# Patient Record
Sex: FEMALE | Race: WHITE | NOT HISPANIC OR LATINO | Employment: FULL TIME | URBAN - METROPOLITAN AREA
[De-identification: names, ages, dates, MRNs, and addresses within clinical notes are randomized per-mention and may not be internally consistent; named-entity substitution may affect disease eponyms.]

---

## 2024-04-08 ENCOUNTER — LAB REQUISITION (OUTPATIENT)
Dept: LAB | Facility: CLINIC | Age: 29
End: 2024-04-08
Payer: COMMERCIAL

## 2024-04-08 DIAGNOSIS — Z01.419 ENCOUNTER FOR GYNECOLOGICAL EXAMINATION (GENERAL) (ROUTINE) WITHOUT ABNORMAL FINDINGS: ICD-10-CM

## 2024-04-08 PROCEDURE — G0123 SCREEN CERV/VAG THIN LAYER: HCPCS | Mod: ORL | Performed by: PHYSICIAN ASSISTANT

## 2024-04-10 LAB
BKR LAB AP GYN ADEQUACY: NORMAL
BKR LAB AP GYN INTERPRETATION: NORMAL
BKR LAB AP HPV REFLEX: NORMAL
BKR LAB AP LMP: NORMAL
BKR LAB AP PREVIOUS ABNL DX: NORMAL
BKR LAB AP PREVIOUS ABNORMAL: NORMAL
PATH REPORT.COMMENTS IMP SPEC: NORMAL
PATH REPORT.COMMENTS IMP SPEC: NORMAL
PATH REPORT.RELEVANT HX SPEC: NORMAL

## 2024-07-18 ENCOUNTER — LAB REQUISITION (OUTPATIENT)
Dept: LAB | Facility: CLINIC | Age: 29
End: 2024-07-18
Payer: COMMERCIAL

## 2024-07-18 DIAGNOSIS — Z34.90 ENCOUNTER FOR SUPERVISION OF NORMAL PREGNANCY, UNSPECIFIED, UNSPECIFIED TRIMESTER: ICD-10-CM

## 2024-07-18 LAB
BASOPHILS # BLD AUTO: 0 10E3/UL (ref 0–0.2)
BASOPHILS NFR BLD AUTO: 0 %
EOSINOPHIL # BLD AUTO: 0.2 10E3/UL (ref 0–0.7)
EOSINOPHIL NFR BLD AUTO: 3 %
ERYTHROCYTE [DISTWIDTH] IN BLOOD BY AUTOMATED COUNT: 12.8 % (ref 10–15)
HBA1C MFR BLD: 5.2 %
HCT VFR BLD AUTO: 40.5 % (ref 35–47)
HGB BLD-MCNC: 14 G/DL (ref 11.7–15.7)
IMM GRANULOCYTES # BLD: 0 10E3/UL
IMM GRANULOCYTES NFR BLD: 0 %
LYMPHOCYTES # BLD AUTO: 2.5 10E3/UL (ref 0.8–5.3)
LYMPHOCYTES NFR BLD AUTO: 28 %
MCH RBC QN AUTO: 30.2 PG (ref 26.5–33)
MCHC RBC AUTO-ENTMCNC: 34.6 G/DL (ref 31.5–36.5)
MCV RBC AUTO: 87 FL (ref 78–100)
MONOCYTES # BLD AUTO: 0.6 10E3/UL (ref 0–1.3)
MONOCYTES NFR BLD AUTO: 6 %
NEUTROPHILS # BLD AUTO: 5.6 10E3/UL (ref 1.6–8.3)
NEUTROPHILS NFR BLD AUTO: 63 %
NRBC # BLD AUTO: 0 10E3/UL
NRBC BLD AUTO-RTO: 0 /100
PLATELET # BLD AUTO: 298 10E3/UL (ref 150–450)
RBC # BLD AUTO: 4.64 10E6/UL (ref 3.8–5.2)
WBC # BLD AUTO: 9 10E3/UL (ref 4–11)

## 2024-07-18 PROCEDURE — 87086 URINE CULTURE/COLONY COUNT: CPT | Mod: ORL | Performed by: OBSTETRICS & GYNECOLOGY

## 2024-07-18 PROCEDURE — 86803 HEPATITIS C AB TEST: CPT | Mod: ORL | Performed by: OBSTETRICS & GYNECOLOGY

## 2024-07-18 PROCEDURE — 83036 HEMOGLOBIN GLYCOSYLATED A1C: CPT | Mod: ORL | Performed by: OBSTETRICS & GYNECOLOGY

## 2024-07-18 PROCEDURE — 80081 OBSTETRIC PANEL INC HIV TSTG: CPT | Mod: ORL | Performed by: OBSTETRICS & GYNECOLOGY

## 2024-07-19 LAB
ABO/RH(D): NORMAL
ANTIBODY SCREEN: NEGATIVE
HBV SURFACE AG SERPL QL IA: NONREACTIVE
HCV AB SERPL QL IA: NONREACTIVE
HIV 1+2 AB+HIV1 P24 AG SERPL QL IA: NONREACTIVE
RPR SER QL: NONREACTIVE
RUBV IGG SERPL QL IA: 1.42 INDEX
RUBV IGG SERPL QL IA: POSITIVE
SPECIMEN EXPIRATION DATE: NORMAL

## 2024-07-20 LAB — BACTERIA UR CULT: NO GROWTH

## 2024-07-27 ENCOUNTER — TRANSFERRED RECORDS (OUTPATIENT)
Dept: HEALTH INFORMATION MANAGEMENT | Facility: CLINIC | Age: 29
End: 2024-07-27

## 2024-08-22 ENCOUNTER — LAB REQUISITION (OUTPATIENT)
Dept: LAB | Facility: CLINIC | Age: 29
End: 2024-08-22
Payer: COMMERCIAL

## 2024-08-22 ENCOUNTER — TRANSFERRED RECORDS (OUTPATIENT)
Dept: HEALTH INFORMATION MANAGEMENT | Facility: CLINIC | Age: 29
End: 2024-08-22

## 2024-08-22 DIAGNOSIS — Z36.89 ENCOUNTER FOR OTHER SPECIFIED ANTENATAL SCREENING: ICD-10-CM

## 2024-08-22 PROCEDURE — 82105 ALPHA-FETOPROTEIN SERUM: CPT | Mod: ORL | Performed by: OBSTETRICS & GYNECOLOGY

## 2024-08-23 LAB
# FETUSES US: NORMAL
AFP MOM SERPL: 1.06
AFP SERPL-MCNC: 39 NG/ML
AGE - REPORTED: 28 YR
CURRENT SMOKER: NO
FAMILY MEMBER DISEASES HX: NO
GA METHOD: NORMAL
GA: NORMAL WK
IDDM PATIENT QL: NO
INTEGRATED SCN PATIENT-IMP: NORMAL
SPECIMEN DRAWN SERPL: NORMAL

## 2024-09-17 ENCOUNTER — MEDICAL CORRESPONDENCE (OUTPATIENT)
Dept: HEALTH INFORMATION MANAGEMENT | Facility: CLINIC | Age: 29
End: 2024-09-17
Payer: COMMERCIAL

## 2024-09-18 ENCOUNTER — TRANSCRIBE ORDERS (OUTPATIENT)
Dept: MATERNAL FETAL MEDICINE | Facility: CLINIC | Age: 29
End: 2024-09-18
Payer: COMMERCIAL

## 2024-09-18 DIAGNOSIS — O26.90 PREGNANCY RELATED CONDITION, ANTEPARTUM: Primary | ICD-10-CM

## 2024-09-19 ENCOUNTER — PRE VISIT (OUTPATIENT)
Dept: MATERNAL FETAL MEDICINE | Facility: CLINIC | Age: 29
End: 2024-09-19
Payer: COMMERCIAL

## 2024-09-23 ENCOUNTER — OFFICE VISIT (OUTPATIENT)
Dept: MATERNAL FETAL MEDICINE | Facility: CLINIC | Age: 29
End: 2024-09-23
Attending: OBSTETRICS & GYNECOLOGY
Payer: COMMERCIAL

## 2024-09-23 ENCOUNTER — HOSPITAL ENCOUNTER (OUTPATIENT)
Dept: ULTRASOUND IMAGING | Facility: CLINIC | Age: 29
Discharge: HOME | End: 2024-09-23
Attending: OBSTETRICS & GYNECOLOGY
Payer: COMMERCIAL

## 2024-09-23 DIAGNOSIS — Q33.0 CONGENITAL PULMONARY AIRWAY MALFORMATION (CPAM): Primary | ICD-10-CM

## 2024-09-23 DIAGNOSIS — O26.90 PREGNANCY RELATED CONDITION, ANTEPARTUM: ICD-10-CM

## 2024-09-23 PROCEDURE — 76811 OB US DETAILED SNGL FETUS: CPT

## 2024-09-23 PROCEDURE — 76811 OB US DETAILED SNGL FETUS: CPT | Mod: 26 | Performed by: OBSTETRICS & GYNECOLOGY

## 2024-09-23 PROCEDURE — 99203 OFFICE O/P NEW LOW 30 MIN: CPT | Mod: 25 | Performed by: OBSTETRICS & GYNECOLOGY

## 2024-09-23 NOTE — PROGRESS NOTES
Please see full imaging report from ViewPoint program under imaging tab.    Thank-you for referring your patient for ultrasound assessment for a fetal CPAM, which is confirmed today in the right side of the fetal chest.    I discussed the findings on today's ultrasound with the patient and her partner. I reviewed the limitations of ultrasound both in detecting aneuploidy and structural abnormalities.  Ultrasound, when views completed, can routinely detect 80-90% of structural abnormalities. She had low risk cell free fetal DNA for genetic screening this pregnancy.     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 We reviewed that CPAMs are the most common lung lesion noted in fetuses. The lesion is abnormally formed lung tissue- abnormal solid or cystic pulmonary tissue with proliferation of bronchial structures at the expense of alveloar development. We discussed that CPAMs have a variable appearance from solid-appearing (microcystic) to complex cystic masses comprised of 1 or more cysts >=5mm (macrocystic).      We discussed that isolated CPAMs are not associated with an increased risk of chromosomal abnormalities, and it is reassuring that she had a low risk NIPT screen.      We reviewed the prognosis for this condition depends on the size of the lesion, the degree of pulmonary hypoplasia, existence of other anomalies and the development of hydrops, but that prognosis is typically excellent. Typically the CPAM grows in size until about 28 weeks and then stabilize or even regresses in size. Occasionally, it may no longer be visible on ultrasound prior to delivery. There is however a risk for fetal hydrops with this condition and serially ultrasounds are recommended. The risk for hydrops increases with rapid growth of the CPAM, mediastinal shift and large dominant cysts  within the mass. We use the CVR (CPAM volume ratio) to determine the risk for fetal hydrops. The CVR today is 0.58, which is reassuring. CVR of <1.6 is associated with a low risk for development of fetal hydrops.    If the lesion regressed and were small, delivery at [facility] is possible, but if the lesion were still substantial in size, or earlier delivery was warranted due to development of hydrops, then delivery would be coordinated at [facility].  They are aware of this possibility.     At this point we will plan to see the patient every other week for surveillance for development of  hydrops and assessment of the CVR. If the CVR is increasing, we can increase surveillance to weekly. Fetal echocardiogram will be scheduled. Follow-up assessment of fetal growth is planned in 4 weeks. We will also coordinate Pediatric Surgery and NICU consultations in the 3rd trimester with NICU as needed depending on findings - this will be done through our Fetal Diagnosis and Treatment Center team at [facility].     Return to primary provider for continued prenatal care until it is determined if transfer of care for alternate delivery site will be required.    If you have questions regarding today's evaluation or if we can be of further service, please contact the Maternal-Fetal Medicine Center.    **Fetal anomalies may be present but not detected**     I spent a total of 40 minutes on the date of this encounter including preparing to see the patient (reviewing medical records/tests), counseling and discussing the plan of care, documenting the visit in the electronic medical record, and communicating with other health care professionals and/or care coordination.    Remberto Jones MD  Maternal Fetal Medicine

## 2024-09-23 NOTE — NURSING NOTE
Patient reports positive fetal movement, no pain, no contractions, leaking of fluid, or bleeding.   SBAR given to [facility], see their note in Epic.

## 2024-10-07 ENCOUNTER — OFFICE VISIT (OUTPATIENT)
Dept: MATERNAL FETAL MEDICINE | Facility: CLINIC | Age: 29
End: 2024-10-07
Attending: OBSTETRICS & GYNECOLOGY
Payer: COMMERCIAL

## 2024-10-07 ENCOUNTER — HOSPITAL ENCOUNTER (OUTPATIENT)
Dept: ULTRASOUND IMAGING | Facility: CLINIC | Age: 29
Discharge: HOME | End: 2024-10-07
Attending: OBSTETRICS & GYNECOLOGY
Payer: COMMERCIAL

## 2024-10-07 DIAGNOSIS — Q33.0 CONGENITAL PULMONARY AIRWAY MALFORMATION (CPAM): ICD-10-CM

## 2024-10-07 DIAGNOSIS — Q33.0 CONGENITAL PULMONARY AIRWAY MALFORMATION (CPAM): Primary | ICD-10-CM

## 2024-10-07 PROCEDURE — 76816 OB US FOLLOW-UP PER FETUS: CPT

## 2024-10-07 PROCEDURE — 76816 OB US FOLLOW-UP PER FETUS: CPT | Mod: 26 | Performed by: STUDENT IN AN ORGANIZED HEALTH CARE EDUCATION/TRAINING PROGRAM

## 2024-10-07 NOTE — NURSING NOTE
Patient presents to [facility] for RL2 at 23w0d due to CPAM check. Positive fetal movement. Denies LOF, vaginal bleeding or cramping/contractions. SBAR given to [facility] MD, see their note in Epic.

## 2024-10-08 NOTE — PROGRESS NOTES
The patient was seen for an ultrasound in the M[facility]Fetal Medicine Center clinic today.  For a detailed report of the ultrasound examination, please see the ultrasound report which can be found under the imaging tab.    If you have questions regarding today's evaluation or if we can be of further service, please contact the Maternal-Fetal Medicine Center.    Conor Howe M.D.  Maternal Fetal-Medicine Specialist

## 2024-10-18 ENCOUNTER — OFFICE VISIT (OUTPATIENT)
Dept: MATERNAL FETAL MEDICINE | Facility: CLINIC | Age: 29
End: 2024-10-18
Attending: OBSTETRICS & GYNECOLOGY
Payer: COMMERCIAL

## 2024-10-18 ENCOUNTER — OFFICE VISIT (OUTPATIENT)
Dept: PEDIATRIC CARDIOLOGY | Facility: CLINIC | Age: 29
End: 2024-10-18
Payer: COMMERCIAL

## 2024-10-18 ENCOUNTER — HOSPITAL ENCOUNTER (OUTPATIENT)
Dept: ULTRASOUND IMAGING | Facility: CLINIC | Age: 29
Discharge: HOME | End: 2024-10-18
Attending: OBSTETRICS & GYNECOLOGY
Payer: COMMERCIAL

## 2024-10-18 ENCOUNTER — HOSPITAL ENCOUNTER (OUTPATIENT)
Dept: CARDIOLOGY | Facility: CLINIC | Age: 29
Discharge: HOME | End: 2024-10-18
Attending: OBSTETRICS & GYNECOLOGY | Admitting: OBSTETRICS & GYNECOLOGY
Payer: COMMERCIAL

## 2024-10-18 DIAGNOSIS — Q33.0 CONGENITAL PULMONARY AIRWAY MALFORMATION (CPAM): ICD-10-CM

## 2024-10-18 DIAGNOSIS — O35.BXX0 ANOMALY OF HEART OF FETUS AFFECTING PREGNANCY, ANTEPARTUM, SINGLE OR UNSPECIFIED FETUS: Primary | ICD-10-CM

## 2024-10-18 DIAGNOSIS — O35.9XX0 FETAL ABNORMALITY AFFECTING MANAGEMENT OF MOTHER, SINGLE OR UNSPECIFIED FETUS: Primary | ICD-10-CM

## 2024-10-18 PROCEDURE — 93325 DOPPLER ECHO COLOR FLOW MAPG: CPT

## 2024-10-18 PROCEDURE — 99202 OFFICE O/P NEW SF 15 MIN: CPT | Mod: 25 | Performed by: PEDIATRICS

## 2024-10-18 PROCEDURE — 76827 ECHO EXAM OF FETAL HEART: CPT | Mod: 26 | Performed by: PEDIATRICS

## 2024-10-18 PROCEDURE — 76825 ECHO EXAM OF FETAL HEART: CPT | Mod: 26 | Performed by: PEDIATRICS

## 2024-10-18 PROCEDURE — 76816 OB US FOLLOW-UP PER FETUS: CPT | Mod: 26 | Performed by: OBSTETRICS & GYNECOLOGY

## 2024-10-18 PROCEDURE — 76816 OB US FOLLOW-UP PER FETUS: CPT

## 2024-10-18 PROCEDURE — 93325 DOPPLER ECHO COLOR FLOW MAPG: CPT | Mod: 26 | Performed by: PEDIATRICS

## 2024-10-18 NOTE — NURSING NOTE
Patient presents to [facility] for RL2 at 24w4d due to growth, CPAM. Denies LOF, vaginal bleeding, cramping/contractions. SBAR given to [facility], see their note in Epic.

## 2024-10-18 NOTE — LETTER
10/18/2024      RE: Giuseppe Morin  [address]  [place] [address]     Dear Colleague,    Thank you for the opportunity to participate in the care of your patient, Giuseppe Morin, at the [facility] at [facility]. Please see a copy of my visit note below.    [facility]   Heart Center Fetal Consult Note    Patient:  Giuseppe Morin MRN:  [id number]   YOB: 1995 Age:  29 year old   Date of Visit:  10/18/2024 PCP:  Sol Vogt MD     Dear Doctor:    I had the pleasure of seeing Giuseppe Morin at the [facility] on 10/18/2024 in fetal cardiology consultation for fetal echocardiogram results. She presented today accompanied by her partner. As you know, she is a 29 year old  at 24w4d who presented for fetal echocardiogram today because of a finding of CPAM on screening ultrasound.     I performed and interpreted the fetal echocardiogram today, which demonstrated Normal fetal cardiac anatomy. Normal fetal intracardiac connections. Normal right and left ventricular size and function. Fetal heart rate is regular at 165 bpm. No hydrops.     I reviewed today's findings with Giuseppe Morin and her partner. She is aware that the study was within normal limits with no major cardiac abnormalities. She is aware of the general limitations of fetal echocardiography. No additional fetal echocardiograms are recommended. No  cardiac follow-up is required.     Thank you for allowing me to participate in Giuseppe's care. Please do not hesitate to contact me with questions or concerns.    This visit was separate from the performance and interpretation of the ultrasound. The majority of the time (>50%) was spent in counseling and coordination of care. I spent approximately 20 minutes in face-to-face time reviewing the above considerations.    Osiel Cr M.D.  Pediatric  Cardiology  [facility]  [address] 4th floor, [place] [facility] [address]  Phone [phone]  Fax [phone]      Please do not hesitate to contact me if you have any questions/concerns.     Sincerely,       Elliot Cr MD

## 2024-10-18 NOTE — PROGRESS NOTES
[facility]   Heart Center Fetal Consult Note    Patient:  Giuseppe Morin MRN:  [id number]   YOB: 1995 Age:  29 year old   Date of Visit:  10/18/2024 PCP:  Sol Vogt MD     Dear Doctor:    I had the pleasure of seeing Giuseppe Morin at the [facility] on 10/18/2024 in fetal cardiology consultation for fetal echocardiogram results. She presented today accompanied by her partner. As you know, she is a 29 year old  at 24w4d who presented for fetal echocardiogram today because of a finding of CPAM on screening ultrasound.     I performed and interpreted the fetal echocardiogram today, which demonstrated Normal fetal cardiac anatomy. Normal fetal intracardiac connections. Normal right and left ventricular size and function. Fetal heart rate is regular at 165 bpm. No hydrops.     I reviewed today's findings with Giuseppe Morin and her partner. She is aware that the study was within normal limits with no major cardiac abnormalities. She is aware of the general limitations of fetal echocardiography. No additional fetal echocardiograms are recommended. No  cardiac follow-up is required.     Thank you for allowing me to participate in Giuseppe's care. Please do not hesitate to contact me with questions or concerns.    This visit was separate from the performance and interpretation of the ultrasound. The majority of the time (>50%) was spent in counseling and coordination of care. I spent approximately 20 minutes in face-to-face time reviewing the above considerations.    Osiel Cr M.D.  Pediatric Cardiology  [address] [facility], [place] [address]  Phone [phone]  Fax [phone]

## 2024-10-18 NOTE — PROGRESS NOTES
Please see full imaging report from ViewPoint program under imaging tab.    Thank-you for referring your patient for ultrasound assessment. I discussed the findings on today's ultrasound with the patient and her partner.     She had a normal fetal echo earlier today. There is no current evidence that the CPAM is increasing in size.     Follow-up is scheduled in 2 weeks to reassess CPAM/CVR and we will re-evaluate growth in four weeks.     As the pregnancy progresses we will arrange consultations with pediatrics/NICU and pediatric surgery. We have not yet determined whether she can deliver with her primary OB team at [facility] or will need to transfer to our team for delivery at [facility].     Return to primary provider for continued prenatal care.    If you have questions regarding today's evaluation or if we can be of further service, please contact the Maternal-Fetal Medicine Center.    **Fetal anomalies may be present but not detected**     Remberto Jones MD  Maternal Fetal Medicine

## 2024-10-24 ENCOUNTER — TRANSFERRED RECORDS (OUTPATIENT)
Dept: HEALTH INFORMATION MANAGEMENT | Facility: CLINIC | Age: 29
End: 2024-10-24
Payer: COMMERCIAL

## 2024-11-07 ENCOUNTER — OFFICE VISIT (OUTPATIENT)
Dept: MATERNAL FETAL MEDICINE | Facility: CLINIC | Age: 29
End: 2024-11-07
Attending: OBSTETRICS & GYNECOLOGY
Payer: COMMERCIAL

## 2024-11-07 ENCOUNTER — HOSPITAL ENCOUNTER (OUTPATIENT)
Dept: ULTRASOUND IMAGING | Facility: CLINIC | Age: 29
Discharge: HOME | End: 2024-11-07
Attending: OBSTETRICS & GYNECOLOGY
Payer: COMMERCIAL

## 2024-11-07 DIAGNOSIS — Q33.0 CONGENITAL PULMONARY AIRWAY MALFORMATION (CPAM): ICD-10-CM

## 2024-11-07 DIAGNOSIS — Q33.2 INTRALOBAR BRONCHOPULMONARY SEQUESTRATION: ICD-10-CM

## 2024-11-07 DIAGNOSIS — Q33.0 CONGENITAL PULMONARY AIRWAY MALFORMATION (CPAM): Primary | ICD-10-CM

## 2024-11-07 DIAGNOSIS — O35.9XX0 FETAL ABNORMALITY AFFECTING MANAGEMENT OF MOTHER, SINGLE OR UNSPECIFIED FETUS: Primary | ICD-10-CM

## 2024-11-07 PROCEDURE — 76816 OB US FOLLOW-UP PER FETUS: CPT | Mod: 26 | Performed by: OBSTETRICS & GYNECOLOGY

## 2024-11-07 PROCEDURE — 76816 OB US FOLLOW-UP PER FETUS: CPT

## 2024-11-07 PROCEDURE — 99213 OFFICE O/P EST LOW 20 MIN: CPT | Mod: 25 | Performed by: OBSTETRICS & GYNECOLOGY

## 2024-11-07 NOTE — NURSING NOTE
Patient presents to [facility] for RL2 at 27w3d due to fetal CPAM. Positive fetal movement. Denies LOF, vaginal bleeding or cramping/contractions. SBAR given to [facility] MD, see their note in Epic.

## 2024-11-07 NOTE — PROGRESS NOTES
The patient was seen for an ultrasound in the Maternal-Fetal Medicine Center at the [facility] today.  For a detailed report of the ultrasound examination, please see the ultrasound report which can be found under the imaging tab.    If you have questions regarding today's evaluation or if we can be of further service, please contact the Maternal-Fetal Medicine Center.    Tsering Arzola MD  , OB/GYN  Maternal-Fetal Medicine  [phone] (Pager)

## 2024-11-14 ENCOUNTER — LAB REQUISITION (OUTPATIENT)
Dept: LAB | Facility: CLINIC | Age: 29
End: 2024-11-14
Payer: COMMERCIAL

## 2024-11-14 DIAGNOSIS — Z36.89 ENCOUNTER FOR OTHER SPECIFIED ANTENATAL SCREENING: ICD-10-CM

## 2024-11-14 LAB
ANTIBODY SCREEN: NEGATIVE
ERYTHROCYTE [DISTWIDTH] IN BLOOD BY AUTOMATED COUNT: 12.7 % (ref 10–15)
HCT VFR BLD AUTO: 38.7 % (ref 35–47)
HGB BLD-MCNC: 12.9 G/DL (ref 11.7–15.7)
MCH RBC QN AUTO: 30.2 PG (ref 26.5–33)
MCHC RBC AUTO-ENTMCNC: 33.3 G/DL (ref 31.5–36.5)
MCV RBC AUTO: 91 FL (ref 78–100)
PLATELET # BLD AUTO: 241 10E3/UL (ref 150–450)
RBC # BLD AUTO: 4.27 10E6/UL (ref 3.8–5.2)
SPECIMEN EXPIRATION DATE: NORMAL
WBC # BLD AUTO: 9 10E3/UL (ref 4–11)

## 2024-11-14 PROCEDURE — 86850 RBC ANTIBODY SCREEN: CPT | Mod: ORL | Performed by: OBSTETRICS & GYNECOLOGY

## 2024-11-14 PROCEDURE — 86780 TREPONEMA PALLIDUM: CPT | Mod: ORL | Performed by: OBSTETRICS & GYNECOLOGY

## 2024-11-14 PROCEDURE — 85027 COMPLETE CBC AUTOMATED: CPT | Mod: ORL | Performed by: OBSTETRICS & GYNECOLOGY

## 2024-11-15 LAB — T PALLIDUM AB SER QL: NONREACTIVE

## 2024-11-21 ENCOUNTER — HOSPITAL ENCOUNTER (OUTPATIENT)
Dept: ULTRASOUND IMAGING | Facility: CLINIC | Age: 29
End: 2024-11-21
Attending: OBSTETRICS & GYNECOLOGY
Payer: COMMERCIAL

## 2024-11-21 ENCOUNTER — OFFICE VISIT (OUTPATIENT)
Dept: MATERNAL FETAL MEDICINE | Facility: CLINIC | Age: 29
End: 2024-11-21
Attending: OBSTETRICS & GYNECOLOGY
Payer: COMMERCIAL

## 2024-11-21 DIAGNOSIS — Q33.0 CONGENITAL PULMONARY AIRWAY MALFORMATION (CPAM): ICD-10-CM

## 2024-11-21 DIAGNOSIS — Q33.0 CONGENITAL PULMONARY AIRWAY MALFORMATION (CPAM): Primary | ICD-10-CM

## 2024-11-21 PROCEDURE — 76816 OB US FOLLOW-UP PER FETUS: CPT

## 2024-11-21 NOTE — PROGRESS NOTES
Please see full imaging report from ViewPoint program under imaging tab.    Remberto Jones MD  Maternal Fetal Medicine

## 2024-12-05 ENCOUNTER — HOSPITAL ENCOUNTER (OUTPATIENT)
Dept: ULTRASOUND IMAGING | Facility: CLINIC | Age: 29
End: 2024-12-05
Attending: OBSTETRICS & GYNECOLOGY
Payer: COMMERCIAL

## 2024-12-05 ENCOUNTER — OFFICE VISIT (OUTPATIENT)
Dept: MATERNAL FETAL MEDICINE | Facility: CLINIC | Age: 29
End: 2024-12-05
Attending: OBSTETRICS & GYNECOLOGY
Payer: COMMERCIAL

## 2024-12-05 DIAGNOSIS — Q33.0 CONGENITAL PULMONARY AIRWAY MALFORMATION (CPAM): ICD-10-CM

## 2024-12-05 DIAGNOSIS — O35.9XX0 FETAL ABNORMALITY AFFECTING MANAGEMENT OF MOTHER, SINGLE OR UNSPECIFIED FETUS: Primary | ICD-10-CM

## 2024-12-05 PROCEDURE — 76816 OB US FOLLOW-UP PER FETUS: CPT

## 2024-12-05 NOTE — NURSING NOTE
Patient presents to [facility] for RL2 at 31w3d due to CPAM/BPS in pregnancy. Positive fetal movement. Denies LOF, vaginal bleeding or cramping/contractions. SBAR given to [facility] MD, see their note in Epic.

## 2024-12-11 ENCOUNTER — HOSPITAL ENCOUNTER (OUTPATIENT)
Dept: ULTRASOUND IMAGING | Facility: CLINIC | Age: 29
Discharge: HOME | End: 2024-12-11
Attending: STUDENT IN AN ORGANIZED HEALTH CARE EDUCATION/TRAINING PROGRAM
Payer: COMMERCIAL

## 2024-12-11 ENCOUNTER — OFFICE VISIT (OUTPATIENT)
Dept: MATERNAL FETAL MEDICINE | Facility: CLINIC | Age: 29
End: 2024-12-11
Attending: STUDENT IN AN ORGANIZED HEALTH CARE EDUCATION/TRAINING PROGRAM
Payer: COMMERCIAL

## 2024-12-11 DIAGNOSIS — Q33.0 CONGENITAL PULMONARY AIRWAY MALFORMATION (CPAM): ICD-10-CM

## 2024-12-11 DIAGNOSIS — O35.9XX0 SUSPECTED FETAL ANOMALY, ANTEPARTUM, SINGLE OR UNSPECIFIED FETUS: Primary | ICD-10-CM

## 2024-12-11 PROCEDURE — 76819 FETAL BIOPHYS PROFIL W/O NST: CPT

## 2024-12-11 NOTE — NURSING NOTE
Patient reports positive fetal movement, no pain, no contractions, leaking of fluid, or bleeding.  Education provided to patient on biophysical profile.  SBAR given to [facility], see their note in Epic.

## 2024-12-12 ENCOUNTER — TRANSFERRED RECORDS (OUTPATIENT)
Dept: HEALTH INFORMATION MANAGEMENT | Facility: CLINIC | Age: 29
End: 2024-12-12
Payer: COMMERCIAL

## 2024-12-19 ENCOUNTER — OFFICE VISIT (OUTPATIENT)
Dept: MATERNAL FETAL MEDICINE | Facility: CLINIC | Age: 29
End: 2024-12-19
Attending: OBSTETRICS & GYNECOLOGY
Payer: COMMERCIAL

## 2024-12-19 ENCOUNTER — HOSPITAL ENCOUNTER (OUTPATIENT)
Dept: ULTRASOUND IMAGING | Facility: CLINIC | Age: 29
Discharge: HOME | End: 2024-12-19
Attending: OBSTETRICS & GYNECOLOGY
Payer: COMMERCIAL

## 2024-12-19 DIAGNOSIS — Q33.0 CONGENITAL PULMONARY AIRWAY MALFORMATION (CPAM): ICD-10-CM

## 2024-12-19 DIAGNOSIS — O35.CXX0: Primary | ICD-10-CM

## 2024-12-19 PROCEDURE — 76816 OB US FOLLOW-UP PER FETUS: CPT

## 2024-12-19 PROCEDURE — 76819 FETAL BIOPHYS PROFIL W/O NST: CPT

## 2024-12-19 NOTE — PROGRESS NOTES
"Please see \"Imaging\" tab under \"Chart Review\" for details of today's visit.    Jovanna Alcantar    "

## 2024-12-19 NOTE — NURSING NOTE
Patient presents to [facility] for RL2/BPP at 33w3d due to CPAM check. Positive fetal movement. Denies LOF, vaginal bleeding or cramping/contractions. SBAR given to M MD, see their note in Epic.

## 2024-12-26 ENCOUNTER — HOSPITAL ENCOUNTER (OUTPATIENT)
Dept: ULTRASOUND IMAGING | Facility: CLINIC | Age: 29
End: 2024-12-26
Attending: STUDENT IN AN ORGANIZED HEALTH CARE EDUCATION/TRAINING PROGRAM
Payer: COMMERCIAL

## 2024-12-26 ENCOUNTER — OFFICE VISIT (OUTPATIENT)
Dept: MATERNAL FETAL MEDICINE | Facility: CLINIC | Age: 29
End: 2024-12-26
Attending: STUDENT IN AN ORGANIZED HEALTH CARE EDUCATION/TRAINING PROGRAM
Payer: COMMERCIAL

## 2024-12-26 DIAGNOSIS — O35.CXX0 FETAL CONGENITAL CYSTIC ADENOMATOID MALFORMATION (CCAM) AFFECTING CARE OF MOTHER: Primary | ICD-10-CM

## 2024-12-26 DIAGNOSIS — Q33.0 CONGENITAL PULMONARY AIRWAY MALFORMATION (CPAM): ICD-10-CM

## 2024-12-26 PROCEDURE — 76819 FETAL BIOPHYS PROFIL W/O NST: CPT

## 2024-12-26 NOTE — PROGRESS NOTES
The patient was seen for an ultrasound in the Maternal-Fetal Medicine Center clinic today.  For a detailed report of the ultrasound examination, please see the ultrasound report which can be found under the imaging tab.    If you have questions regarding today's evaluation or if we can be of further service, please contact the Maternal-Fetal Medicine Center.    Conor oHwe M.D.  Maternal Fetal-Medicine Specialist

## 2024-12-26 NOTE — NURSING NOTE
Patient presents to [facility] for BPP at 34w3d due to fetal CPAM/BPS. Positive fetal movement. Denies LOF, vaginal bleeding or cramping/contractions. SBAR given to [facility] MD, see their note in Epic.

## 2024-12-31 ENCOUNTER — OFFICE VISIT (OUTPATIENT)
Dept: SURGERY | Facility: CLINIC | Age: 29
End: 2024-12-31
Attending: STUDENT IN AN ORGANIZED HEALTH CARE EDUCATION/TRAINING PROGRAM
Payer: COMMERCIAL

## 2024-12-31 DIAGNOSIS — Q33.0 CONGENITAL PULMONARY AIRWAY MALFORMATION (CPAM): ICD-10-CM

## 2024-12-31 PROCEDURE — 99203 OFFICE O/P NEW LOW 30 MIN: CPT | Performed by: STUDENT IN AN ORGANIZED HEALTH CARE EDUCATION/TRAINING PROGRAM

## 2024-12-31 PROCEDURE — 99214 OFFICE O/P EST MOD 30 MIN: CPT | Performed by: STUDENT IN AN ORGANIZED HEALTH CARE EDUCATION/TRAINING PROGRAM

## 2024-12-31 NOTE — NURSING NOTE
NREQQI [[id number]]  Chief Complaint   Patient presents with    Consult     Consult- CPAM/prenatal     Initial LMP 04/29/2024  There is no height or weight on file to calculate BMI.  Medication Reconciliation: complete    Does the patient need any medication refills today? No    Does the patient/parent have MyChart set up? Yes    Does the parent have proxy access? N/A    Is the patient 18 or turning 18 in the next 3 months? N/A   If yes, do they want a consent to communicate on file for their parents to have the ability to communicate? N/A    Has the patient received a flu shot this season? N/A    Do they want one today? N/A      Gypsy Keller LPN

## 2024-12-31 NOTE — LETTER
Tsering Arzola MD  [address]  [place] [address]    RE:  Giuseppe Morin  :  1995  MRN:  [id number]  Date of visit: 2024    Dear Dr. Arzola:    I had the pleasure of seeing Giuseppe Morin with her partner at the [facility] in consultation for prenatal consultation.     As you know, Giuseppe is a 29-year-old  female at 35 weeks gestation.  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  She is followed by [facility] as well as [facility] and [facility], where she is hoping to deliver.  She receives weekly BPPs and growth ultrasounds every 2 weeks.  Her last comprehensive ultrasound demonstrated a stable CVR of 0.34.  A fetal echocardiogram obtained at 24 weeks was normal with no evidence of hydrops. Her cell free fetal DNA for genetic screening was low risk.  Giuseppe and her partner deny any family history of congenital malformations, bleeding disorders, or problems with anesthesia.    We discussed the diagnosis of a congenital lung malformation and the fact that hybrid lesions can be seen.  We discussed the implications for delivery and  care.  Giuseppe may proceed with delivery at [facility].  I recommend a chest x-ray shortly after delivery. If there is no respiratory distress or concerning findings on the x-ray, the infant may be discharged home and plan to follow-up with me at age 2 months.  I explained that I typically recommend a CTA chest at age 2 months and surgery between age 3 to 6 months.  I also mentioned that referral for DICER 1 genetic testing will be indicated for CPAM. I explained that I am in a group of 6 pediatric surgeons.  If there are any concerns for respiratory distress after delivery, the baby should be transferred to [facility] at which time their  child's surgeon will be determined by which of us is on-call.  Giuseppe and her partner were given an opportunity to ask questions, which were answered to her satisfaction.    Thank you very much for allowing me the opportunity to participate in this nice family's care with you.  We look forward to caring for their baby.  Please do not hesitate to contact me with any questions or concerns.    Sincerely,    Jenny Kay MD  Pediatric General & Thoracic Surgery  Office: [phone]  Fax: [phone]

## 2024-12-31 NOTE — PROGRESS NOTES
Tsering Arzola MD  [address] [facility] [address]  [place] [address]    RE:  Giuseppe Morin  :  1995  MRN:  [id number]  Date of visit: 2024    Dear Dr. Arzola:    I had the pleasure of seeing Giuseppe Morin with her partner at the [facility] in consultation for prenatal consultation.     As you know, Giuseppe is a 29-year-old  female at 35 weeks gestation.  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  She is followed by [facility] as well as [facility] and [facility], where she is hoping to deliver.  She receives weekly BPPs and growth ultrasounds every 2 weeks.  Her last comprehensive ultrasound demonstrated a stable CVR of 0.34.  A fetal echocardiogram obtained at 24 weeks was normal with no evidence of hydrops. Her cell free fetal DNA for genetic screening was low risk.  Giuseppe and her partner deny any family history of congenital malformations, bleeding disorders, or problems with anesthesia.    We discussed the diagnosis of a congenital lung malformation and the fact that hybrid lesions can be seen.  We discussed the implications for delivery and  care.  Giuseppe may proceed with delivery at [facility].  I recommend a chest x-ray shortly after delivery. If there is no respiratory distress or concerning findings on the x-ray, the infant may be discharged home and plan to follow-up with me at age 2 months.  I explained that I typically recommend a CTA chest at age 2 months and surgery between age 3 to 6 months.  I also mentioned that referral for DICER 1 genetic testing will be indicated for CPAM. I explained that I am in a group of 6 pediatric surgeons.  If there are any concerns for respiratory distress after delivery, the baby should be transferred to [facility] at which time their  child's surgeon will be determined by which of us is on-call.  Giuseppe and her partner were given an opportunity to ask questions, which were answered to her satisfaction.    Thank you very much for allowing me the opportunity to participate in this nice family's care with you.  We look forward to caring for their baby.  Please do not hesitate to contact me with any questions or concerns.    Sincerely,    Jenny Kay MD  Pediatric General & Thoracic Surgery  Office: [phone]  Fax: [phone]

## 2024-12-31 NOTE — Clinical Note
2024      RE: Giuseppe Morin  [address]  [place] [address]     Dear Colleague,    Thank you for the opportunity to participate in the care of your patient, Giuseppe Morin, at the [facility] at [facility]. Please see a copy of my visit note below.    Sol Vogt  [address]  [place] [address]    RE:  Giuseppe Morin  :  1995  MRN:  [id number]  Date of visit:  ***    Dear  ***:    I had the pleasure of seeing Giuseppe Morin with *** at the [facility] in consultation for ***.         Genetic testing        Thank you very much for allowing me the opportunity to participate in this nice family's care with you.  Please do not hesitate to contact me with any questions or concerns.    Sincerely,    Jenny Kay MD  Pediatric General & Thoracic Surgery  Office: [phone]  Fax: [phone]        Tsering Arzola MD  [address] [facility] [facility] [address]  [place] [address]    RE:  Giuseppe Morin  :  1995  MRN:  [id number]  Date of visit: 2024    Dear Dr. Arzola:    I had the pleasure of seeing Giuseppe Morin with her partner at the [facility] in consultation for prenatal consultation.     As you know, Giuseppe is a 29-year-old  female at 35 weeks and 1 day gestation.  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  She is followed by [facility] as well as [facility] and [facility], where she is hoping to deliver.  She receives weekly BPPs and growth ultrasounds every 2 weeks.  Her last comprehensive ultrasound demonstrated a stable CVR of 0.34.  A fetal echocardiogram obtained at 24 weeks was normal with no evidence of hydrops.  Her cell free fetal DNA for genetic screening was low risk.  Giuseppe and her partner deny any family history of congenital malformations, bleeding disorders, or problems with anesthesia.    We discussed the diagnosis of a congenital lung malformation and the fact that hybrid lesions can be seen.  We discussed the implications for delivery and  care.  Giuseppe may proceed with delivery at [facility].  If there is no evidence of respiratory distress, I recommend a chest x-ray shortly after delivery      Thank you very much for allowing me the opportunity to participate in this nice family's care with you.  Please do not hesitate to contact me with any questions or concerns.    Sincerely,    Pierre Kay MD  Pediatric General & Thoracic Surgery  Office: [phone]  Fax: [phone]          Please do not hesitate to contact me if you have any questions/concerns.     Sincerely,       PIERRE KAY MD

## 2025-01-02 ENCOUNTER — HOSPITAL ENCOUNTER (OUTPATIENT)
Dept: ULTRASOUND IMAGING | Facility: CLINIC | Age: 30
End: 2025-01-02
Attending: OBSTETRICS & GYNECOLOGY
Payer: COMMERCIAL

## 2025-01-02 ENCOUNTER — OFFICE VISIT (OUTPATIENT)
Dept: MATERNAL FETAL MEDICINE | Facility: CLINIC | Age: 30
End: 2025-01-02
Attending: OBSTETRICS & GYNECOLOGY
Payer: COMMERCIAL

## 2025-01-02 DIAGNOSIS — O35.CXX0 FETAL CONGENITAL CYSTIC ADENOMATOID MALFORMATION (CCAM) AFFECTING CARE OF MOTHER: Primary | ICD-10-CM

## 2025-01-02 DIAGNOSIS — Q33.0 CONGENITAL PULMONARY AIRWAY MALFORMATION (CPAM): ICD-10-CM

## 2025-01-02 PROCEDURE — 76816 OB US FOLLOW-UP PER FETUS: CPT

## 2025-01-02 NOTE — PROGRESS NOTES
Please see full imaging report from ViewPoint program under imaging tab.    Thank-you for referring your patient for ultrasound assessment. I discussed the findings on today's ultrasound with the patient.     We will continue to see her weekly until delivery.   She met with the pediatric surgeon on 12/31/24 and has been cleared for delivery at [facility].     I recommend that pediatrics be present at delivery.   Giuseppe's only birth preference is that she be allowed to await spontaneous labor until her HANS as long as US findings are stable and she isn't having any complications. This is a reasonable request.     Follow-up is scheduled weekly for BPP, with growth again in 3 weeks if not yet delivered.     Return to primary provider for continued prenatal care.    If you have questions regarding today's evaluation or if we can be of further service, please contact the Maternal-Fetal Medicine Center.    **Fetal anomalies may be present but not detected**     Remberto Jones MD  Maternal Fetal Medicine

## 2025-01-02 NOTE — NURSING NOTE
Patient presents to [facility] for RL2 at 35w3d due to CPAM, growth. Denies LOF, vaginal bleeding, cramping/contractions. SBAR given to [facility], see their note in Epic.

## 2025-01-09 ENCOUNTER — HOSPITAL ENCOUNTER (OUTPATIENT)
Dept: ULTRASOUND IMAGING | Facility: CLINIC | Age: 30
End: 2025-01-09
Attending: OBSTETRICS & GYNECOLOGY
Payer: COMMERCIAL

## 2025-01-09 ENCOUNTER — OFFICE VISIT (OUTPATIENT)
Dept: MATERNAL FETAL MEDICINE | Facility: CLINIC | Age: 30
End: 2025-01-09
Attending: OBSTETRICS & GYNECOLOGY
Payer: COMMERCIAL

## 2025-01-09 DIAGNOSIS — Q33.0 CONGENITAL PULMONARY AIRWAY MALFORMATION (CPAM): ICD-10-CM

## 2025-01-09 DIAGNOSIS — O35.CXX0 FETAL CONGENITAL CYSTIC ADENOMATOID MALFORMATION (CCAM) AFFECTING CARE OF MOTHER: Primary | ICD-10-CM

## 2025-01-09 PROCEDURE — 76818 FETAL BIOPHYS PROFILE W/NST: CPT

## 2025-01-09 NOTE — PROGRESS NOTES
The patient was seen for an ultrasound in the Maternal-Fetal Medicine Center at the [facility] today.  For a detailed report of the ultrasound examination, please see the ultrasound report which can be found under the imaging tab.    If you have questions regarding today's evaluation or if we can be of further service, please contact the Maternal-Fetal Medicine Center.    Tsering Arzola MD  , OB/GYN  Maternal-Fetal Medicine  [phone] (Pager)

## 2025-01-09 NOTE — NURSING NOTE
NST Performed due to BPP 6/8.  Dr. Arzola reviewed efm tracing. See NST/BPP Doc Flowsheet tab.    Gypsy Gonzalez RN

## 2025-01-09 NOTE — NURSING NOTE
Patient presents to [facility] for BPP at 36w3d due to CPAM. Positive fetal movement. Denies LOF, vaginal bleeding or cramping/contractions. SBAR given to [facility], see their note in Epic.

## 2025-01-15 LAB — GROUP B STREPTOCOCCUS (EXTERNAL): NEGATIVE

## 2025-01-16 ENCOUNTER — OFFICE VISIT (OUTPATIENT)
Dept: MATERNAL FETAL MEDICINE | Facility: CLINIC | Age: 30
End: 2025-01-16
Attending: OBSTETRICS & GYNECOLOGY
Payer: COMMERCIAL

## 2025-01-16 ENCOUNTER — HOSPITAL ENCOUNTER (OUTPATIENT)
Dept: ULTRASOUND IMAGING | Facility: CLINIC | Age: 30
End: 2025-01-16
Attending: OBSTETRICS & GYNECOLOGY
Payer: COMMERCIAL

## 2025-01-16 DIAGNOSIS — O35.9XX0 FETAL ABNORMALITY AFFECTING MANAGEMENT OF MOTHER, ANTEPARTUM, SINGLE OR UNSPECIFIED FETUS: Primary | ICD-10-CM

## 2025-01-16 DIAGNOSIS — Q33.0 CONGENITAL PULMONARY AIRWAY MALFORMATION (CPAM): ICD-10-CM

## 2025-01-16 PROCEDURE — 76819 FETAL BIOPHYS PROFIL W/O NST: CPT

## 2025-01-16 NOTE — NURSING NOTE
Patient presents to [facility] for RL2/BPP at 37w3d due to CPAM/BPS. Positive fetal movement. Denies LOF, vaginal bleeding or cramping/contractions. SBAR given to M MD, see their note in Epic.

## 2025-01-23 ENCOUNTER — HOSPITAL ENCOUNTER (OUTPATIENT)
Dept: ULTRASOUND IMAGING | Facility: CLINIC | Age: 30
End: 2025-01-23
Attending: OBSTETRICS & GYNECOLOGY
Payer: COMMERCIAL

## 2025-01-23 ENCOUNTER — OFFICE VISIT (OUTPATIENT)
Dept: MATERNAL FETAL MEDICINE | Facility: CLINIC | Age: 30
End: 2025-01-23
Attending: OBSTETRICS & GYNECOLOGY
Payer: COMMERCIAL

## 2025-01-23 DIAGNOSIS — Q33.0 CONGENITAL PULMONARY AIRWAY MALFORMATION (CPAM): ICD-10-CM

## 2025-01-23 DIAGNOSIS — O35.CXX0 FETAL CONGENITAL CYSTIC ADENOMATOID MALFORMATION (CCAM) AFFECTING CARE OF MOTHER: ICD-10-CM

## 2025-01-23 DIAGNOSIS — O35.9XX0 FETAL ABNORMALITY AFFECTING MANAGEMENT OF MOTHER, ANTEPARTUM, SINGLE OR UNSPECIFIED FETUS: Primary | ICD-10-CM

## 2025-01-23 PROCEDURE — 76819 FETAL BIOPHYS PROFIL W/O NST: CPT

## 2025-01-23 NOTE — PROGRESS NOTES
The patient was seen for an ultrasound in the Maternal[facility]etal Medicine Center at the [facility] today.  For a detailed report of the ultrasound examination, please see the ultrasound report which can be found under the imaging tab.    If you have questions regarding today's evaluation or if we can be of further service, please contact the Maternal-Fetal Medicine Center.    Tsering Arzola MD  , OB/GYN  Maternal-Fetal Medicine  [phone] (Pager)

## 2025-01-23 NOTE — NURSING NOTE
Patient presents to [facility] for BPP at 38w3d due to CPAM. Positive fetal movement. Denies LOF, vaginal bleeding or cramping/contractions. SBAR given to MADDI ROBERTS, see their note in Epic.

## 2025-01-30 ENCOUNTER — OFFICE VISIT (OUTPATIENT)
Dept: MATERNAL FETAL MEDICINE | Facility: CLINIC | Age: 30
End: 2025-01-30
Attending: STUDENT IN AN ORGANIZED HEALTH CARE EDUCATION/TRAINING PROGRAM
Payer: COMMERCIAL

## 2025-01-30 ENCOUNTER — HOSPITAL ENCOUNTER (OUTPATIENT)
Dept: ULTRASOUND IMAGING | Facility: CLINIC | Age: 30
End: 2025-01-30
Attending: STUDENT IN AN ORGANIZED HEALTH CARE EDUCATION/TRAINING PROGRAM
Payer: COMMERCIAL

## 2025-01-30 DIAGNOSIS — O35.CXX0 FETAL CONGENITAL CYSTIC ADENOMATOID MALFORMATION (CCAM) AFFECTING CARE OF MOTHER: ICD-10-CM

## 2025-01-30 DIAGNOSIS — Q33.0 CONGENITAL PULMONARY AIRWAY MALFORMATION (CPAM): Primary | ICD-10-CM

## 2025-01-30 DIAGNOSIS — O35.9XX0 FETAL ABNORMALITY AFFECTING MANAGEMENT OF MOTHER, ANTEPARTUM, SINGLE OR UNSPECIFIED FETUS: ICD-10-CM

## 2025-01-30 PROCEDURE — 76816 OB US FOLLOW-UP PER FETUS: CPT

## 2025-01-30 PROCEDURE — 76819 FETAL BIOPHYS PROFIL W/O NST: CPT

## 2025-01-30 NOTE — PROGRESS NOTES
The patient was seen for an ultrasound in the [facility] Center today.  For a detailed report of the ultrasound examination, please see the ultrasound report which can be found under the imaging tab.    If you have questions regarding today's evaluation or if we can be of further service, please contact the Maternal-Fetal Medicine Center.    Yesy Russell MD  , OB/GYN  Maternal-Fetal Medicine

## 2025-01-30 NOTE — NURSING NOTE
Patient reports positive fetal movement, denies pain, denies leaking of fluid or bleeding. States she is having occasional contractions- labor precautions reviewed. Patient denies headache, visual changes, nausea/vomiting, epigastric pain related to preeclampsia. Education provided to patient on RL2, when to call L&D. SBAR given to KESHAV ROBERTS, see their note in Epic.    Gypsy Gonzalez RN

## 2025-02-02 ENCOUNTER — HOSPITAL ENCOUNTER (INPATIENT)
Facility: CLINIC | Age: 30
LOS: 1 days | Discharge: HOME | End: 2025-02-04
Attending: OBSTETRICS & GYNECOLOGY | Admitting: OBSTETRICS & GYNECOLOGY
Payer: COMMERCIAL

## 2025-02-02 PROBLEM — Z36.89 ENCOUNTER FOR TRIAGE IN PREGNANT PATIENT: Status: ACTIVE | Noted: 2025-02-02

## 2025-02-02 PROCEDURE — G0463 HOSPITAL OUTPT CLINIC VISIT: HCPCS

## 2025-02-02 RX ORDER — LIDOCAINE 40 MG/G
CREAM TOPICAL
Status: DISCONTINUED | OUTPATIENT
Start: 2025-02-02 | End: 2025-02-03 | Stop reason: HOSPADM

## 2025-02-03 ENCOUNTER — ANESTHESIA EVENT (OUTPATIENT)
Dept: OBGYN | Facility: CLINIC | Age: 30
End: 2025-02-03
Payer: COMMERCIAL

## 2025-02-03 ENCOUNTER — ANESTHESIA (OUTPATIENT)
Dept: OBGYN | Facility: CLINIC | Age: 30
End: 2025-02-03
Payer: COMMERCIAL

## 2025-02-03 LAB
ABO + RH BLD: ABNORMAL
BLD GP AB INVEST PLASRBC-IMP: NORMAL
BLD GP AB SCN SERPL QL: POSITIVE
ERYTHROCYTE [DISTWIDTH] IN BLOOD BY AUTOMATED COUNT: 13.2 % (ref 10–15)
HCT VFR BLD AUTO: 35.9 % (ref 35–47)
HGB BLD-MCNC: 12.5 G/DL (ref 11.7–15.7)
MCH RBC QN AUTO: 29.8 PG (ref 26.5–33)
MCHC RBC AUTO-ENTMCNC: 34.8 G/DL (ref 31.5–36.5)
MCV RBC AUTO: 86 FL (ref 78–100)
PLATELET # BLD AUTO: 208 10E3/UL (ref 150–450)
RBC # BLD AUTO: 4.2 10E6/UL (ref 3.8–5.2)
SPECIMEN EXP DATE BLD: ABNORMAL
SPECIMEN EXP DATE BLD: NORMAL
T PALLIDUM AB SER QL: NONREACTIVE
WBC # BLD AUTO: 10.4 10E3/UL (ref 4–11)

## 2025-02-03 PROCEDURE — 00HU33Z INSERTION OF INFUSION DEVICE INTO SPINAL CANAL, PERCUTANEOUS APPROACH: ICD-10-PCS | Performed by: SURGERY

## 2025-02-03 PROCEDURE — 999N000157 HC STATISTIC RCP TIME EA 10 MIN

## 2025-02-03 PROCEDURE — 722N000001 HC LABOR CARE VAGINAL DELIVERY SINGLE

## 2025-02-03 PROCEDURE — 36415 COLL VENOUS BLD VENIPUNCTURE: CPT | Performed by: OBSTETRICS & GYNECOLOGY

## 2025-02-03 PROCEDURE — 250N000011 HC RX IP 250 OP 636: Performed by: SURGERY

## 2025-02-03 PROCEDURE — 85018 HEMOGLOBIN: CPT | Performed by: OBSTETRICS & GYNECOLOGY

## 2025-02-03 PROCEDURE — 86870 RBC ANTIBODY IDENTIFICATION: CPT | Performed by: OBSTETRICS & GYNECOLOGY

## 2025-02-03 PROCEDURE — 85041 AUTOMATED RBC COUNT: CPT | Performed by: OBSTETRICS & GYNECOLOGY

## 2025-02-03 PROCEDURE — 3E0R3BZ INTRODUCTION OF ANESTHETIC AGENT INTO SPINAL CANAL, PERCUTANEOUS APPROACH: ICD-10-PCS | Performed by: SURGERY

## 2025-02-03 PROCEDURE — 370N000003 HC ANESTHESIA WARD SERVICE: Performed by: STUDENT IN AN ORGANIZED HEALTH CARE EDUCATION/TRAINING PROGRAM

## 2025-02-03 PROCEDURE — 120N000012 HC R&B POSTPARTUM

## 2025-02-03 PROCEDURE — 258N000003 HC RX IP 258 OP 636: Performed by: OBSTETRICS & GYNECOLOGY

## 2025-02-03 PROCEDURE — 250N000009 HC RX 250: Performed by: OBSTETRICS & GYNECOLOGY

## 2025-02-03 PROCEDURE — 999N000016 HC STATISTIC ATTENDANCE AT DELIVERY

## 2025-02-03 PROCEDURE — 0KQM0ZZ REPAIR PERINEUM MUSCLE, OPEN APPROACH: ICD-10-PCS | Performed by: OBSTETRICS & GYNECOLOGY

## 2025-02-03 PROCEDURE — 86900 BLOOD TYPING SEROLOGIC ABO: CPT | Performed by: OBSTETRICS & GYNECOLOGY

## 2025-02-03 PROCEDURE — 250N000013 HC RX MED GY IP 250 OP 250 PS 637: Performed by: OBSTETRICS & GYNECOLOGY

## 2025-02-03 PROCEDURE — 86780 TREPONEMA PALLIDUM: CPT | Performed by: OBSTETRICS & GYNECOLOGY

## 2025-02-03 RX ORDER — OXYTOCIN/0.9 % SODIUM CHLORIDE 30/500 ML
100-340 PLASTIC BAG, INJECTION (ML) INTRAVENOUS CONTINUOUS PRN
Status: DISCONTINUED | OUTPATIENT
Start: 2025-02-03 | End: 2025-02-03

## 2025-02-03 RX ORDER — FENTANYL CITRATE 50 UG/ML
100 INJECTION, SOLUTION INTRAMUSCULAR; INTRAVENOUS
Status: DISCONTINUED | OUTPATIENT
Start: 2025-02-03 | End: 2025-02-03 | Stop reason: HOSPADM

## 2025-02-03 RX ORDER — ACETAMINOPHEN 325 MG/1
650 TABLET ORAL EVERY 4 HOURS PRN
Status: DISCONTINUED | OUTPATIENT
Start: 2025-02-03 | End: 2025-02-04 | Stop reason: HOSPADM

## 2025-02-03 RX ORDER — METOCLOPRAMIDE HYDROCHLORIDE 5 MG/ML
10 INJECTION INTRAMUSCULAR; INTRAVENOUS EVERY 6 HOURS PRN
Status: DISCONTINUED | OUTPATIENT
Start: 2025-02-03 | End: 2025-02-03 | Stop reason: HOSPADM

## 2025-02-03 RX ORDER — ROPIVACAINE HYDROCHLORIDE 2 MG/ML
10 INJECTION, SOLUTION EPIDURAL; INFILTRATION; PERINEURAL ONCE
Status: DISCONTINUED | OUTPATIENT
Start: 2025-02-03 | End: 2025-02-03 | Stop reason: HOSPADM

## 2025-02-03 RX ORDER — HYDROCORTISONE 25 MG/G
CREAM TOPICAL 3 TIMES DAILY PRN
Status: DISCONTINUED | OUTPATIENT
Start: 2025-02-03 | End: 2025-02-04 | Stop reason: HOSPADM

## 2025-02-03 RX ORDER — TRANEXAMIC ACID 10 MG/ML
1 INJECTION, SOLUTION INTRAVENOUS EVERY 30 MIN PRN
Status: DISCONTINUED | OUTPATIENT
Start: 2025-02-03 | End: 2025-02-04 | Stop reason: HOSPADM

## 2025-02-03 RX ORDER — LOPERAMIDE HYDROCHLORIDE 2 MG/1
4 CAPSULE ORAL
Status: DISCONTINUED | OUTPATIENT
Start: 2025-02-03 | End: 2025-02-04 | Stop reason: HOSPADM

## 2025-02-03 RX ORDER — LOPERAMIDE HYDROCHLORIDE 2 MG/1
2 CAPSULE ORAL
Status: DISCONTINUED | OUTPATIENT
Start: 2025-02-03 | End: 2025-02-04 | Stop reason: HOSPADM

## 2025-02-03 RX ORDER — ONDANSETRON 2 MG/ML
4 INJECTION INTRAMUSCULAR; INTRAVENOUS EVERY 6 HOURS PRN
Status: DISCONTINUED | OUTPATIENT
Start: 2025-02-03 | End: 2025-02-03 | Stop reason: HOSPADM

## 2025-02-03 RX ORDER — IBUPROFEN 400 MG/1
800 TABLET, FILM COATED ORAL EVERY 6 HOURS PRN
Status: DISCONTINUED | OUTPATIENT
Start: 2025-02-03 | End: 2025-02-04 | Stop reason: HOSPADM

## 2025-02-03 RX ORDER — ONDANSETRON 4 MG/1
4 TABLET, ORALLY DISINTEGRATING ORAL EVERY 6 HOURS PRN
Status: DISCONTINUED | OUTPATIENT
Start: 2025-02-03 | End: 2025-02-03 | Stop reason: HOSPADM

## 2025-02-03 RX ORDER — TRANEXAMIC ACID 10 MG/ML
1 INJECTION, SOLUTION INTRAVENOUS EVERY 30 MIN PRN
Status: DISCONTINUED | OUTPATIENT
Start: 2025-02-03 | End: 2025-02-03 | Stop reason: HOSPADM

## 2025-02-03 RX ORDER — ACETAMINOPHEN 325 MG/1
650 TABLET ORAL EVERY 4 HOURS PRN
Status: DISCONTINUED | OUTPATIENT
Start: 2025-02-03 | End: 2025-02-03 | Stop reason: HOSPADM

## 2025-02-03 RX ORDER — PROCHLORPERAZINE MALEATE 5 MG/1
10 TABLET ORAL EVERY 6 HOURS PRN
Status: DISCONTINUED | OUTPATIENT
Start: 2025-02-03 | End: 2025-02-03 | Stop reason: HOSPADM

## 2025-02-03 RX ORDER — NALOXONE HYDROCHLORIDE 0.4 MG/ML
0.4 INJECTION, SOLUTION INTRAMUSCULAR; INTRAVENOUS; SUBCUTANEOUS
Status: DISCONTINUED | OUTPATIENT
Start: 2025-02-03 | End: 2025-02-03 | Stop reason: HOSPADM

## 2025-02-03 RX ORDER — OXYTOCIN 10 [USP'U]/ML
10 INJECTION, SOLUTION INTRAMUSCULAR; INTRAVENOUS
Status: DISCONTINUED | OUTPATIENT
Start: 2025-02-03 | End: 2025-02-03 | Stop reason: HOSPADM

## 2025-02-03 RX ORDER — LOPERAMIDE HYDROCHLORIDE 2 MG/1
2 CAPSULE ORAL
Status: DISCONTINUED | OUTPATIENT
Start: 2025-02-03 | End: 2025-02-03 | Stop reason: HOSPADM

## 2025-02-03 RX ORDER — NALOXONE HYDROCHLORIDE 0.4 MG/ML
0.2 INJECTION, SOLUTION INTRAMUSCULAR; INTRAVENOUS; SUBCUTANEOUS
Status: DISCONTINUED | OUTPATIENT
Start: 2025-02-03 | End: 2025-02-03 | Stop reason: HOSPADM

## 2025-02-03 RX ORDER — BISACODYL 10 MG
10 SUPPOSITORY, RECTAL RECTAL DAILY PRN
Status: DISCONTINUED | OUTPATIENT
Start: 2025-02-03 | End: 2025-02-04 | Stop reason: HOSPADM

## 2025-02-03 RX ORDER — MODIFIED LANOLIN
OINTMENT (GRAM) TOPICAL
Status: DISCONTINUED | OUTPATIENT
Start: 2025-02-03 | End: 2025-02-04 | Stop reason: HOSPADM

## 2025-02-03 RX ORDER — MISOPROSTOL 200 UG/1
800 TABLET ORAL
Status: DISCONTINUED | OUTPATIENT
Start: 2025-02-03 | End: 2025-02-04 | Stop reason: HOSPADM

## 2025-02-03 RX ORDER — OXYTOCIN 10 [USP'U]/ML
10 INJECTION, SOLUTION INTRAMUSCULAR; INTRAVENOUS
Status: DISCONTINUED | OUTPATIENT
Start: 2025-02-03 | End: 2025-02-03

## 2025-02-03 RX ORDER — KETOROLAC TROMETHAMINE 30 MG/ML
30 INJECTION, SOLUTION INTRAMUSCULAR; INTRAVENOUS
Status: DISCONTINUED | OUTPATIENT
Start: 2025-02-03 | End: 2025-02-03

## 2025-02-03 RX ORDER — METOCLOPRAMIDE 10 MG/1
10 TABLET ORAL EVERY 6 HOURS PRN
Status: DISCONTINUED | OUTPATIENT
Start: 2025-02-03 | End: 2025-02-03 | Stop reason: HOSPADM

## 2025-02-03 RX ORDER — DOCUSATE SODIUM 100 MG/1
100 CAPSULE, LIQUID FILLED ORAL DAILY
Status: DISCONTINUED | OUTPATIENT
Start: 2025-02-03 | End: 2025-02-04 | Stop reason: HOSPADM

## 2025-02-03 RX ORDER — EPHEDRINE SULFATE 50 MG/ML
5 INJECTION, SOLUTION INTRAMUSCULAR; INTRAVENOUS; SUBCUTANEOUS
Status: DISCONTINUED | OUTPATIENT
Start: 2025-02-03 | End: 2025-02-03 | Stop reason: HOSPADM

## 2025-02-03 RX ORDER — SODIUM CHLORIDE, SODIUM LACTATE, POTASSIUM CHLORIDE, CALCIUM CHLORIDE 600; 310; 30; 20 MG/100ML; MG/100ML; MG/100ML; MG/100ML
INJECTION, SOLUTION INTRAVENOUS CONTINUOUS
Status: DISCONTINUED | OUTPATIENT
Start: 2025-02-03 | End: 2025-02-03 | Stop reason: HOSPADM

## 2025-02-03 RX ORDER — MISOPROSTOL 200 UG/1
400 TABLET ORAL
Status: DISCONTINUED | OUTPATIENT
Start: 2025-02-03 | End: 2025-02-04 | Stop reason: HOSPADM

## 2025-02-03 RX ORDER — OXYTOCIN/0.9 % SODIUM CHLORIDE 30/500 ML
340 PLASTIC BAG, INJECTION (ML) INTRAVENOUS CONTINUOUS PRN
Status: DISCONTINUED | OUTPATIENT
Start: 2025-02-03 | End: 2025-02-04 | Stop reason: HOSPADM

## 2025-02-03 RX ORDER — METHYLERGONOVINE MALEATE 0.2 MG/ML
200 INJECTION INTRAVENOUS
Status: DISCONTINUED | OUTPATIENT
Start: 2025-02-03 | End: 2025-02-03 | Stop reason: HOSPADM

## 2025-02-03 RX ORDER — CITRIC ACID/SODIUM CITRATE 334-500MG
30 SOLUTION, ORAL ORAL
Status: DISCONTINUED | OUTPATIENT
Start: 2025-02-03 | End: 2025-02-03 | Stop reason: HOSPADM

## 2025-02-03 RX ORDER — NALBUPHINE HYDROCHLORIDE 10 MG/ML
2.5-5 INJECTION INTRAMUSCULAR; INTRAVENOUS; SUBCUTANEOUS EVERY 6 HOURS PRN
Status: DISCONTINUED | OUTPATIENT
Start: 2025-02-03 | End: 2025-02-04 | Stop reason: HOSPADM

## 2025-02-03 RX ORDER — OXYTOCIN/0.9 % SODIUM CHLORIDE 30/500 ML
1-24 PLASTIC BAG, INJECTION (ML) INTRAVENOUS CONTINUOUS
Status: DISCONTINUED | OUTPATIENT
Start: 2025-02-03 | End: 2025-02-03 | Stop reason: HOSPADM

## 2025-02-03 RX ORDER — IBUPROFEN 400 MG/1
800 TABLET, FILM COATED ORAL
Status: DISCONTINUED | OUTPATIENT
Start: 2025-02-03 | End: 2025-02-03

## 2025-02-03 RX ORDER — OXYTOCIN/0.9 % SODIUM CHLORIDE 30/500 ML
340 PLASTIC BAG, INJECTION (ML) INTRAVENOUS CONTINUOUS PRN
Status: DISCONTINUED | OUTPATIENT
Start: 2025-02-03 | End: 2025-02-03 | Stop reason: HOSPADM

## 2025-02-03 RX ORDER — CARBOPROST TROMETHAMINE 250 UG/ML
250 INJECTION, SOLUTION INTRAMUSCULAR
Status: DISCONTINUED | OUTPATIENT
Start: 2025-02-03 | End: 2025-02-04 | Stop reason: HOSPADM

## 2025-02-03 RX ORDER — OXYTOCIN 10 [USP'U]/ML
10 INJECTION, SOLUTION INTRAMUSCULAR; INTRAVENOUS
Status: DISCONTINUED | OUTPATIENT
Start: 2025-02-03 | End: 2025-02-04 | Stop reason: HOSPADM

## 2025-02-03 RX ORDER — MISOPROSTOL 200 UG/1
800 TABLET ORAL
Status: DISCONTINUED | OUTPATIENT
Start: 2025-02-03 | End: 2025-02-03 | Stop reason: HOSPADM

## 2025-02-03 RX ORDER — MISOPROSTOL 200 UG/1
400 TABLET ORAL
Status: DISCONTINUED | OUTPATIENT
Start: 2025-02-03 | End: 2025-02-03 | Stop reason: HOSPADM

## 2025-02-03 RX ORDER — LOPERAMIDE HYDROCHLORIDE 2 MG/1
4 CAPSULE ORAL
Status: DISCONTINUED | OUTPATIENT
Start: 2025-02-03 | End: 2025-02-03 | Stop reason: HOSPADM

## 2025-02-03 RX ORDER — METHYLERGONOVINE MALEATE 0.2 MG/ML
200 INJECTION INTRAVENOUS
Status: DISCONTINUED | OUTPATIENT
Start: 2025-02-03 | End: 2025-02-04 | Stop reason: HOSPADM

## 2025-02-03 RX ORDER — CARBOPROST TROMETHAMINE 250 UG/ML
250 INJECTION, SOLUTION INTRAMUSCULAR
Status: DISCONTINUED | OUTPATIENT
Start: 2025-02-03 | End: 2025-02-03 | Stop reason: HOSPADM

## 2025-02-03 RX ORDER — ROPIVACAINE HYDROCHLORIDE 2 MG/ML
INJECTION, SOLUTION EPIDURAL; INFILTRATION; PERINEURAL
Status: COMPLETED | OUTPATIENT
Start: 2025-02-03 | End: 2025-02-03

## 2025-02-03 RX ADMIN — SODIUM CHLORIDE, POTASSIUM CHLORIDE, SODIUM LACTATE AND CALCIUM CHLORIDE 1000 ML: 600; 310; 30; 20 INJECTION, SOLUTION INTRAVENOUS at 02:40

## 2025-02-03 RX ADMIN — SODIUM CHLORIDE, POTASSIUM CHLORIDE, SODIUM LACTATE AND CALCIUM CHLORIDE: 600; 310; 30; 20 INJECTION, SOLUTION INTRAVENOUS at 06:00

## 2025-02-03 RX ADMIN — Medication: at 03:23

## 2025-02-03 RX ADMIN — IBUPROFEN 800 MG: 400 TABLET, FILM COATED ORAL at 11:51

## 2025-02-03 RX ADMIN — Medication 340 ML/HR: at 10:46

## 2025-02-03 RX ADMIN — ROPIVACAINE HYDROCHLORIDE 10 ML: 2 INJECTION, SOLUTION EPIDURAL; INFILTRATION at 03:19

## 2025-02-03 RX ADMIN — ACETAMINOPHEN 650 MG: 325 TABLET, FILM COATED ORAL at 17:47

## 2025-02-03 RX ADMIN — IBUPROFEN 800 MG: 400 TABLET, FILM COATED ORAL at 17:47

## 2025-02-03 RX ADMIN — ACETAMINOPHEN 650 MG: 325 TABLET, FILM COATED ORAL at 11:51

## 2025-02-03 ASSESSMENT — ACTIVITIES OF DAILY LIVING (ADL)
ADLS_ACUITY_SCORE: 15
ADLS_ACUITY_SCORE: 15
ADLS_ACUITY_SCORE: 20
ADLS_ACUITY_SCORE: 15
ADLS_ACUITY_SCORE: 20
ADLS_ACUITY_SCORE: 20
ADLS_ACUITY_SCORE: 15
ADLS_ACUITY_SCORE: 20
ADLS_ACUITY_SCORE: 20
ADLS_ACUITY_SCORE: 15
ADLS_ACUITY_SCORE: 15
ADLS_ACUITY_SCORE: 20
ADLS_ACUITY_SCORE: 20
ADLS_ACUITY_SCORE: 21

## 2025-02-03 NOTE — PROGRESS NOTES
Patient ambulated to BR with assist of one. Patient was able to void. Transferred to [facility] via  with infant in arms. Report given to Nandini ALDRIDGE RN.

## 2025-02-03 NOTE — PROVIDER NOTIFICATION
02/03/25 0059   Provider Notification   Provider Name/Title Eric ROBERTS   Method of Notification Phone   Request Evaluate - Remote   Notification Reason SVE     SVE 4/70-80/-2 BBOW.   Orders: Admit to L&D, OK for pain meds, recheck SVE as indicated.    Pt updated with the plan.

## 2025-02-03 NOTE — ANESTHESIA PREPROCEDURE EVALUATION
"Anesthesia Pre-Procedure Evaluation    Patient: Giuseppe Morin   MRN: [id number] : 1995        Procedure :           History reviewed. No pertinent past medical history.   History reviewed. No pertinent surgical history.   No Known Allergies   Social History     Tobacco Use    Smoking status: Never    Smokeless tobacco: Never   Substance Use Topics    Alcohol use: Never      Wt Readings from Last 1 Encounters:   No data found for Wt        Anesthesia Evaluation            ROS/MED HX  ENT/Pulmonary:    (-) asthma   Neurologic:  - neg neurologic ROS     Cardiovascular:    (-) PIH   METS/Exercise Tolerance:     Hematologic:     (+)  no anticoagulation therapy, no coagulopathy,             Musculoskeletal:       GI/Hepatic:       Renal/Genitourinary:       Endo:       Psychiatric/Substance Use:       Infectious Disease:       Malignancy:       Other:            Physical Exam    Airway        Mallampati: II   TM distance: > 3 FB   Neck ROM: full     Respiratory Devices and Support         Dental  no notable dental history         Cardiovascular   cardiovascular exam normal          Pulmonary   pulmonary exam normal                OUTSIDE LABS:  CBC:   Lab Results   Component Value Date    WBC 10.4 2025    WBC 9.0 2024    HGB 12.5 2025    HGB 12.9 2024    HCT 35.9 2025    HCT 38.7 2024     2025     2024     BMP: No results found for: \"NA\", \"POTASSIUM\", \"CHLORIDE\", \"CO2\", \"BUN\", \"CR\", \"GLC\"  COAGS: No results found for: \"PTT\", \"INR\", \"FIBR\"  POC: No results found for: \"BGM\", \"HCG\", \"HCGS\"  HEPATIC: No results found for: \"ALBUMIN\", \"PROTTOTAL\", \"ALT\", \"AST\", \"GGT\", \"ALKPHOS\", \"BILITOTAL\", \"BILIDIRECT\", \"LIZBETH\"  OTHER:   Lab Results   Component Value Date    A1C 5.2 2024       Anesthesia Plan    ASA Status:  2       Anesthesia Type: Epidural.              Consents    Anesthesia Plan(s) and associated risks, benefits, and realistic alternatives " discussed. Questions answered and patient/representative(s) expressed understanding.     - Discussed:     - Discussed with:  Patient            Postoperative Care            Comments:    Other Comments: Orders to manage the epidural infusion have been entered, and through coordination with the nurse, we will continute to manage and monitor the patient's labor epidural.  We will continuously be available to adjust as needed thruout the entire L&D process.            Pro Larson MD    I have reviewed the pertinent notes and labs in the chart from the past 30 days and (re)examined the patient.  Any updates or changes from those notes are reflected in this note.    Clinically Significant Risk Factors Present on Admission

## 2025-02-03 NOTE — L&D DELIVERY NOTE
Admission date: 2025  Delivery date:  25  Place of delivery: [facility]    ADMITTING DIAGNOSIS: IUP at 40 wks, spontaneous labor, stable fetal CPAM    PROCEDURES:      HISTORY: The patient is a 29 year old  at 40w0d  wks gestation admitted to labor and delivery in active labor.  Her pregnancy was complicated by CPAM-followed by M and pediatric surgery.      Her  labs showed blood type A negative, antibody screen was negative, rubella immune,  Hepatitis B negative, syphilis negative, and her group B strep screen was negative.      The patient was admitted.  She received epidrual for pain control. Rupture of membranes was spontaneous at 0500. She progressed to complete by 0800 hours.     The patient began pushing at 0915 hours and delivered a viable female infant at 1036 hours, with Apgars of 8 and 9 at 1 and 5 minutes respectively. The infant weight pending. A nuchal cord was reduced after delivery.  Delayed cord clamping was performed.  NICU team in attendance but nothing was required.     The placenta was delivered intact with a 3-vessel cord at 1041 hours. Examination of the cervix and vagina revealed no evidence of lacerations. A second-degree perineal laceration was repaired with 3-0 Vicryl in a routine fashion. Estimated blood loss was 150 cc. Mom and baby are stable postpartum.

## 2025-02-03 NOTE — PROGRESS NOTES
Giuseppe and her  Saran transferred ambulatory to [facility] at 0130. Bedside report to Kera HUDSON RN, who will assume cares at this time.

## 2025-02-03 NOTE — PROVIDER NOTIFICATION
02/02/25 2397   Provider Notification   Provider Name/Title Eric ROBERTS   Method of Notification Phone   Request Evaluate - Remote   Notification Reason Patient Arrived;Labor Status;Status Update;SVE     Orders: have patient walk for one hour, then recheck SVE for change.

## 2025-02-03 NOTE — H&P
[facility] Labor and Delivery History and Physical    Giuseppe Morin MRN# [id number]   Age: 29 year old YOB: 1995     Date of Admission:  2025    Primary care provider: Sol Vogt           Chief Complaint:   Giuseppe Morin is a 29 year old female who is 40w0d pregnant and being admitted for active labor management.  Pregnancy complicated by fetal CPAM=stable, has seen peds surgery and plan for surgery when baby 1 yr age          Pregnancy history:     OBSTETRIC HISTORY:    OB History    Para Term  AB Living   1 0 0 0 0 0   SAB IAB Ectopic Multiple Live Births   0 0 0 0 0      # Outcome Date GA Lbr Varun/2nd Weight Sex Type Anes PTL Lv   1 Current                EDC: Estimated Date of Delivery: Feb 3, 2025    Prenatal Labs:   Lab Results   Component Value Date    AS Positive (A) 2025    HEPBANG Nonreactive 2024    HGB 12.5 2025       GBS Status:   Lab Results   Component Value Date    GBS Negative 01/15/2025       Active Problem List  Patient Active Problem List   Diagnosis    Encounter for triage in pregnant patient    Indication for care in labor or delivery       Medication Prior to Admission  Medications Prior to Admission   Medication Sig Dispense Refill Last Dose/Taking    Prenatal-FeFum-FA-DHA w/o A (PRENATAL + DHA PO)    2025   .        Maternal Past Medical History:   History reviewed. No pertinent past medical history.                    Family History:   I have reviewed this patient's family history            Social History:     Social History     Tobacco Use    Smoking status: Never    Smokeless tobacco: Never   Substance Use Topics    Alcohol use: Never            Review of Systems:   C: NEGATIVE for fever, chills, change in weight  E/M: NEGATIVE for ear, mouth and throat problems  R: NEGATIVE for significant cough or SOB  CV: NEGATIVE for chest pain, palpitations or peripheral edema          Physical Exam:   Vitals were  reviewed    Constitutional: Awake, alert, cooperative, no apparent distress, and appears stated age.  Eyes: Lids and lashes normal, pupils equal, round and reactive to light, extra ocular muscles intact, sclera clear, conjunctiva normal.  ENT: Normocephalic, without obvious abnormality, atramatic, sinuses nontender on palpation, external ears without lesions, oral pharynx with moist mucus membranes, tonsils without erythema or exudates, gums normal and good dentition.  Neck: Supple, symmetrical, trachea midline, no adenopathy, thyroid symmetric, not enlarged and no tenderness, skin normal.  Hematologic / Lymphatic: No cervical lymphadenopathy and no supraclavicular lymphadenopathy.  Back: Symmetric, no curvature, spinous processes are non-tender on palpation, paraspinous muscles are non-tender on palpation, no costal vertebral tenderness.  Lungs: No increased work of breathing, good air exchange, clear to auscultation bilaterally, no crackles or wheezing.  Cardiovascular: Regular rate and rhythm, normal S1 and S2, no S3 or S4, and no murmur noted.  Chest / Breast: Breasts symmetrical, skin without lesion(s), no nipple retraction or dimpling, no nipple discharge, no masses palpated, no axillary or supraclavicular adenopathy.  Abdomen: No scars, normal bowel sounds, soft, non-distended, non-tender, no masses palpated, no hepatosplenomegally.  Genitourinary: No urethral discharge, normal external genitalia, no hernia.  Musculoskeletal: No redness, warmth, or swelling of the joints.  Full range of motion noted.  Motor strength is 5 out of 5 all extremities bilaterally.  Tone is normal.  Neurologic: Awake, alert, oriented to name, place and time.  Cranial nerves II-XII are grossly intact.  Motor is 5 out of 5 bilaterally.  Cerebellar finger to nose, heel to shin intact.  Sensory is intact.  Babinski down going, Romberg negative, and gait is normal.  Neuropsychiatric: Normal affect, mood, orientation, memory and  insight.  Skin: No rashes, erythema, pallor, petechia or purpura.     Cervix:   Membranes: SROM   Dilation: 10   Effacement: 100%   Station:+2    Presentation:Cephalic  Fetal Heart Rate Tracing: reactive and reassuring  Tocometer: external monitor                       Assessment:   Giuseppe Morin is a 40w0d pregnant female admitted with active labor management.          Plan:   Admit - see IP orders    Sol Vogt MD

## 2025-02-03 NOTE — ANESTHESIA PROCEDURE NOTES
"Epidural catheter Procedure Note    Pre-Procedure   Staff -        Anesthesiologist:  Pro Larson MD       Performed By: anesthesiologist       Location: OB       Pre-Anesthestic Checklist: patient identified, IV checked, risks and benefits discussed, informed consent, monitors and equipment checked, pre-op evaluation and at physician/surgeon's request  Timeout:       Correct Patient: Yes        Correct Procedure: Yes        Correct Site: Yes        Correct Position: Yes   Procedure Documentation  Procedure: epidural catheter         Patient Position: sitting       Patient Prep/Sterile Barriers: sterile gloves, mask, patient draped       Skin prep: Betadine       Local skin infiltrated with 3 mL of 1% lidocaine.        Insertion Site: L3-4. (midline approach).       Technique: LORT saline        LO at 5 cm.       Needle Type: ToLehoy needle       Needle Gauge: 17.        Needle Length (Inches): 3.5        Catheter: 19 G.          Catheter threaded easily.         4 cm epidural space.         Threaded 9 cm at skin.         # of attempts: 1 and  # of redirects:          : 0.    Assessment/Narrative         Paresthesias: No.       Test dose of 3 mL lidocaine 1.5% w/ 1:200,000 epinephrine at.         Test dose negative, 3 minutes after injection, for signs of intravascular, subdural, or intrathecal injection.       Insertion/Infusion Method: LORT saline       Aspiration negative for Heme or CSF via Epidural Catheter.    Medication(s) Administered   0.2% Ropivacaine (Epidural) - EPIDURAL   10 mL - 2/3/2025 3:19:00 AM   Comments:  Pt tolerated well. No complications.   Catheter taped sterile and securely with sterile medical adhesive spray and tegaderm.   Pt placed back in supine with MANOJ.   FHTs stable post-procedure.        FOR [facility] ([facility]) ONLY:   Pain Team Contact information: please page the Pain Team Via [employer]. Search \"Pain\". During daytime hours, please page the attending first. At night please " page the resident first.

## 2025-02-03 NOTE — PLAN OF CARE
Patient transferred from labor and delivery at 1340 Report taken from Caitlyn OSUNA. Safety and security, and education reviewed. Baby bands checked. VSS. Fundus firm and midline. Scant flow. , pain controled with tylenol and ibuprofen Breastfeeding. Ambulating free of dizziness or lightheaded. Voiding without difficulty. Encouraged to call with needs, questions, or concerns. Will continue to monitor.

## 2025-02-03 NOTE — PLAN OF CARE
Data: Patient admitted to room 215 at 0130. Patient is a . Prenatal record reviewed.   OB History    Para Term  AB Living   1 0 0 0 0 0   SAB IAB Ectopic Multiple Live Births   0 0 0 0 0      # Outcome Date GA Lbr Varun/2nd Weight Sex Type Anes PTL Lv   1 Current            .  Medical History: History reviewed. No pertinent past medical history..  Gestational age 40w0d. Vital signs per doc flowsheet. Fetal movement present. Patient reports labor/contractions as reason for admission. Support persons Saran present.  Action: Report from Rabia CRUZ RN obtained at 0130. Care of patient assumed at 0130. Verbal consent for EFM, external fetal monitors applied. Admission assessment completed. Patient and support persons educated on labor process. Patient instructed to report change in fetal movement, contractions, vaginal leaking of fluid or bleeding, abdominal pain, or any concerns related to the pregnancy to her nurse/physician. Patient oriented to room, call light in reach.   Response: Dr. Reyes informed of pt arrival. Plan per provider is continue to let pt labor on own, recheck in 2 hours, OK for epidural or pain medications when needed. Patient verbalized understanding of education and verbalized agreement with plan. Patient coping with labor via slow rhythmic breathing, repositioning and support from partner  Kera Parra, ENRRIQUE

## 2025-02-03 NOTE — PROGRESS NOTES
Data: [facility]: 2025 11:02 PM.  Reason for maternal/fetal assessment is uterine contractions. Patient reports contractions for the past two days but have gotten more regular and more intense in the past few hours. Patient denies leaking of vaginal fluid/rupture of membranes, vaginal bleeding, abdominal pain, pelvic pressure, vomiting, headache, visual disturbances, epigastric or RUQ pain, significant edema. Patient reports fetal movement is normal. Patient is a 39w6d .  Prenatal record reviewed. Pregnancy has been uncomplicated.    Vital signs wnl. Support person is present.     Action: Verbal consent for EFM. Triage assessment completed.     Response: Patient verbalized agreement with plan. Will contact Eric ROBERTS with update and further orders.

## 2025-02-04 VITALS
OXYGEN SATURATION: 97 % | TEMPERATURE: 98.4 F | DIASTOLIC BLOOD PRESSURE: 88 MMHG | WEIGHT: 164.9 LBS | RESPIRATION RATE: 16 BRPM | HEART RATE: 107 BPM | SYSTOLIC BLOOD PRESSURE: 130 MMHG

## 2025-02-04 LAB
ABO + RH BLD: NORMAL
FETAL CELL SCN BLD-IMP: NORMAL
HGB BLD-MCNC: 12.2 G/DL (ref 11.7–15.7)
SPECIMEN EXP DATE BLD: NORMAL

## 2025-02-04 PROCEDURE — 85018 HEMOGLOBIN: CPT | Performed by: OBSTETRICS & GYNECOLOGY

## 2025-02-04 PROCEDURE — 250N000011 HC RX IP 250 OP 636: Performed by: OBSTETRICS & GYNECOLOGY

## 2025-02-04 PROCEDURE — 250N000013 HC RX MED GY IP 250 OP 250 PS 637: Performed by: OBSTETRICS & GYNECOLOGY

## 2025-02-04 PROCEDURE — 86900 BLOOD TYPING SEROLOGIC ABO: CPT | Performed by: OBSTETRICS & GYNECOLOGY

## 2025-02-04 PROCEDURE — 36415 COLL VENOUS BLD VENIPUNCTURE: CPT | Performed by: OBSTETRICS & GYNECOLOGY

## 2025-02-04 PROCEDURE — 85461 HEMOGLOBIN FETAL: CPT | Performed by: OBSTETRICS & GYNECOLOGY

## 2025-02-04 RX ORDER — IBUPROFEN 800 MG/1
800 TABLET, FILM COATED ORAL EVERY 6 HOURS PRN
COMMUNITY
Start: 2025-02-04

## 2025-02-04 RX ORDER — DOCUSATE SODIUM 100 MG/1
100 CAPSULE, LIQUID FILLED ORAL DAILY
COMMUNITY
Start: 2025-02-05

## 2025-02-04 RX ORDER — ACETAMINOPHEN 325 MG/1
650 TABLET ORAL EVERY 4 HOURS PRN
COMMUNITY
Start: 2025-02-04

## 2025-02-04 RX ADMIN — ACETAMINOPHEN 650 MG: 325 TABLET, FILM COATED ORAL at 12:31

## 2025-02-04 RX ADMIN — HUMAN RHO(D) IMMUNE GLOBULIN 300 MCG: 1500 SOLUTION INTRAMUSCULAR; INTRAVENOUS at 10:31

## 2025-02-04 RX ADMIN — IBUPROFEN 800 MG: 400 TABLET, FILM COATED ORAL at 12:32

## 2025-02-04 RX ADMIN — IBUPROFEN 800 MG: 400 TABLET, FILM COATED ORAL at 06:36

## 2025-02-04 RX ADMIN — ACETAMINOPHEN 650 MG: 325 TABLET, FILM COATED ORAL at 06:36

## 2025-02-04 RX ADMIN — DOCUSATE SODIUM 100 MG: 100 CAPSULE, LIQUID FILLED ORAL at 07:48

## 2025-02-04 ASSESSMENT — ACTIVITIES OF DAILY LIVING (ADL)
ADLS_ACUITY_SCORE: 22
ADLS_ACUITY_SCORE: 22
ADLS_ACUITY_SCORE: 20
ADLS_ACUITY_SCORE: 22
ADLS_ACUITY_SCORE: 20
ADLS_ACUITY_SCORE: 22
ADLS_ACUITY_SCORE: 20
ADLS_ACUITY_SCORE: 22

## 2025-02-04 NOTE — PROGRESS NOTES
[facility]       DAILY NOTE - POSTPARTUM DAY 1     SUBJECTIVE:     Pain controlled? Yes  Tolerating a regular diet? YES  Ambulating? YES  Voiding without difficulty? Yes    OBJECTIVE:  Vitals:    25 2007 25 0017 25 0418 25 0745   BP: 139/88 128/82 137/87 130/88   BP Location:       Patient Position:       Cuff Size:       Pulse: 106 87 92 107   Resp: 16 16 16 16   Temp: 99.1  F (37.3  C) 98.8  F (37.1  C) 98.7  F (37.1  C) 98.4  F (36.9  C)   TempSrc: Oral Oral Oral Oral   SpO2:           Constitutional: healthy, alert, and no distress    Abdomen:  Uterine fundus is firm, non-tender and at the level of the umbilicus     Extr neg      LABS:  Hemoglobin   Date Value Ref Range Status   2025 12.2 11.7 - 15.7 g/dL Final   2025 12.5 11.7 - 15.7 g/dL Final       ASSESSMENT:  Post-partum day #1 s/p   Pregnancy complicated by fetal CPAM    Doing well.       PLAN:   Discharge today.  Return to clinic in 2 and 6 weeks.  Continue routine postpartum cares    Sol Vogt MD

## 2025-02-04 NOTE — LACTATION NOTE
"Lactation visit with GiuseppeROSETTA, and baby girl.    Family has decided to discharge home this morning after infant turns 24 hours old. Giuseppe shares infant has been breastfeeding really well, she has no concerns or questions at this time. They are following with [facility], and advised they have lactation support with in their clinic for ongoing breastfeeding support.       Discussed  breastfeeding basics:   1. Watch for early feeding cues (licking lips, stirring or rooting, sucking movement with mouth, hands to mouth).  2. Infant should breastfeed on demand and a minimum of 8 times in 24 hours. Encourage/offer to breastfeed infant at least 3 hours (from the start of the last feeding).   3. Offer infant both breasts with each feeding, \"dinner and dessert\"!    Reviewed breast feeding section in our \"Guide to Postpartum and  Care.\" Highlighting pages that educates to  feeding patterns/behavior: Day 1 infant may be more sleepy (the birthday nap); followed by cluster-feeding (breastfeeding marathon) on second day/night. Also recommended this handbook for future topics including, engorgement, signs/symptoms of mastitis, milk storage guidelines, etc.     Discussed physiology of milk production from colostrum through milk \"coming in\" typically between day 3-5; emphasizing adequate early stimulation to the breast is what causes this change to occur. Educated that early pumping is not necessary if we are having good and regular breastfeeding sessions. But did provide anticipatory guidance to pumping: finding correct flange size, when to begin pumping and building milk storage (ie: pumping once infant is about one month of age, post first morning breast-feed). Educated on products to help with passive milk collection (ie: Haakaa) and when to offer first  bottle. Giuseppe has a new breast pump for home use.     Offered 3 criteria used to know our infants are well fed. . .  1) Feed infant on " "demand ALWAYS and at least every 3 hours. Goal is 8 feedings in 24 hours. Discussed listening for infant to have audible swallows along with watching for changes in infant's stool color. Encouraged mother to offer both breasts with every feeding session. Talked about the difference of nutritive vs non-nutritive suckling patterns.  2) We reviewed the feeding/output log in back of our booklet. \"What goes in-- must come out\" which is why tracking infant's wet/dirty diapers is important. A well fed infant should be meeting their output goals. Suggested downloading an infant feeding rakesh for ease of tracking.   3) Infant's weight: Educated to normal infant weight loss and when infant should be back to birth weight.     Discharge feeding plan recommendations: provide unlimited, on-demand breast feedings: At least 8-12 times/24 hours (reviewed early feeding cues). Suggested pumping if baby has a poor feeding or if supplementation is necessary. Encouraged on-going use of a feeding log or rakesh to record feedings along with void/stool patterns. Avoid pacifiers (until 1 month of age per AAP guidelines) and supplementation with formula unless medically indicated.     Follow up with Pediatrician as requested and encouraged lactation follow up. Provided [facility] outpatient lactation resources. Appreciative of visit.    Silvia Torres RN, IBCLC          "

## 2025-02-04 NOTE — ANESTHESIA POSTPROCEDURE EVALUATION
Patient: Giuseppe Morin    Procedure: * No procedures listed *       Anesthesia Type:  Epidural    Note:  Disposition: Outpatient   Postop Pain Control: Uneventful            Sign Out: Well controlled pain   PONV: No   Neuro/Psych: Uneventful            Sign Out: Acceptable/Baseline neuro status   Airway/Respiratory: Uneventful            Sign Out: Acceptable/Baseline resp. status   CV/Hemodynamics: Uneventful            Sign Out: Acceptable CV status; No obvious hypovolemia; No obvious fluid overload   Other NRE: NONE   DID A NON-ROUTINE EVENT OCCUR? No    Event details/Postop Comments:  Patient doing well per nursing notes. Patient discharged prior to exam. Per notes: no headaches, low back pain or residual numbness/paresthesias. No apparent anesthetic complications.              Last vitals:  Vitals:    02/04/25 0017 02/04/25 0418 02/04/25 0745   BP: 128/82 137/87 130/88   Pulse: 87 92 107   Resp: 16 16 16   Temp: 37.1  C (98.8  F) 37.1  C (98.7  F) 36.9  C (98.4  F)   SpO2:          Electronically Signed By: Terri Black MD  February 4, 2025  4:03 PM

## 2025-02-04 NOTE — PLAN OF CARE
Goal Outcome Evaluation:               Vital signs stable. Postpartum assessment WDL. Pain controlled with tylenol and motrin . Patient up ambulating  voiding without difficulty. Patient c/o tunnel vision state like fainting on and off  sitting in bed vitals stable /84 HR 88 denies any signs and symptoms patient state it went away with ambulating and also state was not able sleep yesterday encouraged patient to eat food and sleep now call if getting worsening patient verbalized understanding patient slept well denies any fainting or tunnel vision now  Working on Breastfeeding. Patient and infant bonding well. Will continue with current plan of care.

## 2025-02-04 NOTE — DISCHARGE INSTRUCTIONS
Warning Signs after Having a Baby    Keep this paper on your fridge or somewhere else where you can see it.    Call your provider if you have any of these symptoms up to 12 weeks after having your baby.    Thoughts of hurting yourself or your baby  Pain in your chest or trouble breathing  Severe headache not helped by pain medicine  Eyesight concerns (blurry vision, seeing spots or flashes of light, other changes to eyesight)  Fainting, shaking or other signs of a seizure    Call 9-1-1 if you feel that it is an emergency.     The symptoms below can happen to anyone after giving birth. They can be very serious. Call your provider if you have any of these warning signs.    My provider s phone number: _______________________    Losing too much blood (hemorrhage)    Call your provider if you soak through a pad in less than an hour or pass blood clots bigger than a golf ball. These may be signs that you are bleeding too much.    Blood clots in the legs or lungs    After you give birth, your body naturally clots its blood to help prevent blood loss. Sometimes this increased clotting can happen in other areas of the body, like the legs or lungs. This can block your blood flow and be very dangerous.     Call your provider if you:  Have a red, swollen spot on the back of your leg that is warm or painful when you touch it.   Are coughing up blood.     Infection    Call your provider if you have any of these symptoms:  Fever of 100.4 F (38 C) or higher.  Pain or redness around your stitches if you had an incision.   Any yellow, white, or green fluid coming from places where you had stitches or surgery.    Mood Problems (postpartum depression)    Many people feel sad or have mood changes after having a baby. But for some people, these mood swings are worse.     Call your provider right away if you feel so anxious or nervous that you can't care for yourself or your baby.    Preeclampsia (high blood pressure)    Even if you  didn't have high blood pressure when you were pregnant, you are at risk for the high blood pressure disease called preeclampsia. This risk can last up to 12 weeks after giving birth.     Call your provider if you have:   Pain on your right side under your rib cage  Sudden swelling in the hands and face    Remember: You know your body. If something doesn't feel right, get medical help.     For informational purposes only. Not to replace the advice of your health care provider. Copyright 2020 [facility]. All rights reserved. Clinically reviewed by Mary Zhao, RNC-OB, MSN. [employer] [id number] - Rev 02/23.

## 2025-02-04 NOTE — PLAN OF CARE
Goal Outcome Evaluation:      Plan of Care Reviewed With: patient, spouse    Overall Patient Progress: improvingOverall Patient Progress: improving  Vital signs stable. Postpartum assessment WDL. Pain controlled with Tylenol and Ibuprofen. Patient voiding without difficulty. Ambulating in room free of dizziness.   Breastfeeding on cue with assist. Patient and spouse bonding well with infant.  Encouraged to call with questions or concerns. Will continue with current plan of care.

## 2025-02-04 NOTE — PLAN OF CARE
D: VSS, assessments WDL.   I: Pt. received complete discharge paperwork and home medications .  Pt. was given times of last dose for all discharge medications in writing on discharge medication sheets.  Discharge teaching included home medication, pain management, activity restrictions, postpartum cares, and signs and symptoms of infection.    A: Discharge outcomes on care plan met.  Mother states understanding and comfort with self care and follow up care.   P: Pt. Discharged.  Pt. was accompanied by spouse and left with personal belongings. .  Pt. to follow up with OB provider per discharge instructions.  Pt. had no further questions at the time of discharge and no unmet needs were identified.

## 2025-02-04 NOTE — PLAN OF CARE
Goal Outcome Evaluation:      Plan of Care Reviewed With: patient             Vital signs stable. Hgb 12.2 today Postpartum assessment WDL. Pain controlled with tylenol and motrin .Rhogam given  Patient up ambulating voiding without difficulty. Working on Breastfeeding  Patient and infant bonding well.planning to discharge today  Will continue with current plan of care.

## 2025-05-18 ENCOUNTER — HEALTH MAINTENANCE LETTER (OUTPATIENT)
Age: 30
End: 2025-05-18